# Patient Record
Sex: FEMALE | Race: BLACK OR AFRICAN AMERICAN | URBAN - METROPOLITAN AREA
[De-identification: names, ages, dates, MRNs, and addresses within clinical notes are randomized per-mention and may not be internally consistent; named-entity substitution may affect disease eponyms.]

---

## 2021-03-04 ENCOUNTER — OFFICE VISIT (OUTPATIENT)
Dept: URBAN - METROPOLITAN AREA CLINIC 98 | Facility: CLINIC | Age: 31
End: 2021-03-04

## 2021-03-04 RX ORDER — INFLIXIMAB 100 MG/10ML
INFUSE 5 MG/KG OVER NO LESS THAN 2 HOUR(S) BY INTRAVENOUS ROUTE EVERY 6 WEEKS INJECTION, POWDER, LYOPHILIZED, FOR SOLUTION INTRAVENOUS
Qty: 1 | Refills: 0 | Status: ACTIVE | COMMUNITY
Start: 1900-01-01

## 2021-03-08 ENCOUNTER — OFFICE VISIT (OUTPATIENT)
Dept: URBAN - METROPOLITAN AREA CLINIC 98 | Facility: CLINIC | Age: 31
End: 2021-03-08

## 2021-03-08 RX ORDER — INFLIXIMAB 100 MG/10ML
INFUSE 5 MG/KG OVER NO LESS THAN 2 HOUR(S) BY INTRAVENOUS ROUTE EVERY 6 WEEKS INJECTION, POWDER, LYOPHILIZED, FOR SOLUTION INTRAVENOUS
Qty: 1 | Refills: 0 | Status: ACTIVE | COMMUNITY
Start: 1900-01-01

## 2021-03-22 ENCOUNTER — OFFICE VISIT (OUTPATIENT)
Dept: URBAN - METROPOLITAN AREA CLINIC 98 | Facility: CLINIC | Age: 31
End: 2021-03-22

## 2021-03-22 RX ORDER — INFLIXIMAB 100 MG/10ML
INFUSE 5 MG/KG OVER NO LESS THAN 2 HOUR(S) BY INTRAVENOUS ROUTE EVERY 6 WEEKS INJECTION, POWDER, LYOPHILIZED, FOR SOLUTION INTRAVENOUS
Qty: 1 | Refills: 0 | Status: ACTIVE | COMMUNITY
Start: 1900-01-01

## 2023-01-18 ENCOUNTER — OFFICE VISIT (OUTPATIENT)
Dept: URBAN - METROPOLITAN AREA CLINIC 105 | Facility: CLINIC | Age: 33
End: 2023-01-18
Payer: COMMERCIAL

## 2023-01-18 VITALS
HEART RATE: 81 BPM | DIASTOLIC BLOOD PRESSURE: 76 MMHG | WEIGHT: 126.6 LBS | HEIGHT: 68 IN | SYSTOLIC BLOOD PRESSURE: 114 MMHG | TEMPERATURE: 97.4 F | BODY MASS INDEX: 19.19 KG/M2

## 2023-01-18 DIAGNOSIS — K52.9 IBD (INFLAMMATORY BOWEL DISEASE): ICD-10-CM

## 2023-01-18 DIAGNOSIS — N82.3 RECTOVAGINAL FISTULA: ICD-10-CM

## 2023-01-18 DIAGNOSIS — R63.0 LOSS OF APPETITE: ICD-10-CM

## 2023-01-18 DIAGNOSIS — R10.84 GENERALIZED ABDOMINAL PAIN: ICD-10-CM

## 2023-01-18 DIAGNOSIS — K50.113 CROHN'S DISEASE OF LARGE INTESTINE WITH FISTULA: ICD-10-CM

## 2023-01-18 PROBLEM — 7620006: Status: ACTIVE | Noted: 2023-01-18

## 2023-01-18 PROBLEM — 65619001: Status: ACTIVE | Noted: 2023-01-18

## 2023-01-18 PROBLEM — 79890006: Status: ACTIVE | Noted: 2023-01-18

## 2023-01-18 PROCEDURE — 99204 OFFICE O/P NEW MOD 45 MIN: CPT | Performed by: INTERNAL MEDICINE

## 2023-01-18 RX ORDER — MOXIFLOXACIN HYDROCHLORIDE 400 MG/1
1 TABLET TABLET, FILM COATED ORAL ONCE A DAY
Qty: 10 | OUTPATIENT
Start: 2023-01-18 | End: 2023-01-28

## 2023-01-18 RX ORDER — BISACODYL 5 MG
TAKE 4 TABLET, DELAYED RELEASE (ENTERIC COATED) ORAL
Qty: 4 | OUTPATIENT
Start: 2023-01-18 | End: 2023-01-19

## 2023-01-18 RX ORDER — SODIUM, POTASSIUM,MAG SULFATES 17.5-3.13G
177 ML SOLUTION, RECONSTITUTED, ORAL ORAL
Qty: 1 KIT | Refills: 0 | OUTPATIENT
Start: 2023-01-18 | End: 2023-01-19

## 2023-01-18 RX ORDER — INFLIXIMAB 100 MG/10ML
INFUSE 5 MG/KG OVER NO LESS THAN 2 HOUR(S) BY INTRAVENOUS ROUTE EVERY 6 WEEKS INJECTION, POWDER, LYOPHILIZED, FOR SOLUTION INTRAVENOUS
Qty: 1 | Refills: 0 | Status: DISCONTINUED | COMMUNITY
Start: 1900-01-01

## 2023-01-18 NOTE — HPI-TODAY'S VISIT:
Pt says that at the age of 16, she was diagnosed with RA. she was treated for that. was at [facility] in [place]. she had a rectovaginal fistula and diagnosed with Remicade. Moved to [place] in 2014. was followed by [facility] and was on Remicade at the time. had insurance issues and had to come off that.  - Has had no treatment at all in two years. she reports having abdominal pain and "digestive issues'. having acid reflux. no blood in the stool. - her stools are relatively normal. she has some constipation and some straining.

## 2023-01-19 ENCOUNTER — TELEPHONE ENCOUNTER (OUTPATIENT)
Dept: URBAN - METROPOLITAN AREA CLINIC 105 | Facility: CLINIC | Age: 33
End: 2023-01-19

## 2023-01-19 RX ORDER — MOXIFLOXACIN HYDROCHLORIDE 400 MG/1
1 TABLET TABLET, FILM COATED ORAL ONCE A DAY
Qty: 10
Start: 2023-01-18 | End: 2023-01-29

## 2023-01-22 LAB
A/G RATIO: 1.4
ALBUMIN: 4.5
ALKALINE PHOSPHATASE: 63
ALT (SGPT): 17
ANCA SCREEN: NEGATIVE
AST (SGOT): 22
BILIRUBIN, TOTAL: 0.3
BUN/CREATININE RATIO: (no result)
BUN: 11
C-REACTIVE PROTEIN, QUANT: 6.3
CALCIUM: 9.8
CARBON DIOXIDE, TOTAL: 27
CHLORIDE: 105
CREATININE: 0.63
EGFR: 122
FOLATE (FOLIC ACID), SERUM: 11.8
GLOBULIN, TOTAL: 3.2
GLUCOSE: 69
HEMATOCRIT: 37.1
HEMOGLOBIN: 12
MCH: 27.9
MCHC: 32.3
MCV: 86.3
MPV: 9.9
MYELOPEROXIDASE ANTIBODY: <1
PLATELET COUNT: 377
POTASSIUM: 4
PROTEIN, TOTAL: 7.7
PROTEINASE-3 ANTIBODY: <1
RDW: 15.7
RED BLOOD CELL COUNT: 4.3
SACCHAROMYCES CEREVISIAE AB (ASCA) (IGA): 11.1
SACCHAROMYCES CEREVISIAE AB (ASCA) (IGG): 11.9
SODIUM: 139
VITAMIN B12: 336
VITAMIN D,25-OH,TOTAL,IA: 14
WHITE BLOOD CELL COUNT: 7.3

## 2023-01-23 ENCOUNTER — TELEPHONE ENCOUNTER (OUTPATIENT)
Dept: URBAN - METROPOLITAN AREA CLINIC 105 | Facility: CLINIC | Age: 33
End: 2023-01-23

## 2023-01-23 RX ORDER — ERGOCALCIFEROL 1.25 MG/1
2 TABLETS CAPSULE ORAL
Qty: 20 | Refills: 1 | OUTPATIENT
Start: 2023-01-23 | End: 2023-07-22

## 2023-02-17 ENCOUNTER — CLAIMS CREATED FROM THE CLAIM WINDOW (OUTPATIENT)
Dept: URBAN - METROPOLITAN AREA SURGERY CENTER 16 | Facility: SURGERY CENTER | Age: 33
End: 2023-02-17
Payer: COMMERCIAL

## 2023-02-17 ENCOUNTER — CLAIMS CREATED FROM THE CLAIM WINDOW (OUTPATIENT)
Dept: URBAN - METROPOLITAN AREA SURGERY CENTER 16 | Facility: SURGERY CENTER | Age: 33
End: 2023-02-17

## 2023-02-17 DIAGNOSIS — K29.60 ADENOPAPILLOMATOSIS GASTRICA: ICD-10-CM

## 2023-02-17 DIAGNOSIS — K50.80 CROHN'S COLITIS: ICD-10-CM

## 2023-02-17 PROCEDURE — G8907 PT DOC NO EVENTS ON DISCHARG: HCPCS | Performed by: INTERNAL MEDICINE

## 2023-02-17 PROCEDURE — 45380 COLONOSCOPY AND BIOPSY: CPT | Performed by: INTERNAL MEDICINE

## 2023-02-17 PROCEDURE — 43239 EGD BIOPSY SINGLE/MULTIPLE: CPT | Performed by: INTERNAL MEDICINE

## 2023-04-19 ENCOUNTER — WEB ENCOUNTER (OUTPATIENT)
Dept: URBAN - METROPOLITAN AREA CLINIC 105 | Facility: CLINIC | Age: 33
End: 2023-04-19

## 2023-04-19 RX ORDER — PREDNISONE 10 MG/1
4 PO DAILY X 10 DAYS, THEN 3 PO DAILY X 10 DAYS, THEN 2 PO DAILY X 10 DAYS, THEN 1 PO DAILY X 10 DAYS TABLET ORAL ONCE A DAY
Qty: 100 | Refills: 0 | OUTPATIENT
Start: 2023-04-21 | End: 2023-05-31

## 2023-04-26 ENCOUNTER — TELEPHONE ENCOUNTER (OUTPATIENT)
Dept: URBAN - METROPOLITAN AREA CLINIC 105 | Facility: CLINIC | Age: 33
End: 2023-04-26

## 2023-04-26 ENCOUNTER — WEB ENCOUNTER (OUTPATIENT)
Dept: URBAN - METROPOLITAN AREA CLINIC 105 | Facility: CLINIC | Age: 33
End: 2023-04-26

## 2023-04-26 ENCOUNTER — OFFICE VISIT (OUTPATIENT)
Dept: URBAN - METROPOLITAN AREA CLINIC 105 | Facility: CLINIC | Age: 33
End: 2023-04-26
Payer: COMMERCIAL

## 2023-04-26 DIAGNOSIS — E55.9 VITAMIN D DEFICIENCY: ICD-10-CM

## 2023-04-26 DIAGNOSIS — K50.113 CROHN'S DISEASE OF LARGE INTESTINE WITH FISTULA: ICD-10-CM

## 2023-04-26 PROCEDURE — 99214 OFFICE O/P EST MOD 30 MIN: CPT | Performed by: INTERNAL MEDICINE

## 2023-04-26 RX ORDER — PREDNISONE 10 MG/1
4 PO DAILY X 10 DAYS, THEN 3 PO DAILY X 10 DAYS, THEN 2 PO DAILY X 10 DAYS, THEN 1 PO DAILY X 10 DAYS TABLET ORAL ONCE A DAY
Qty: 100 | Refills: 0 | Status: ACTIVE | COMMUNITY
Start: 2023-04-21 | End: 2023-05-31

## 2023-04-26 RX ORDER — ERGOCALCIFEROL 1.25 MG/1
2 TABLETS CAPSULE ORAL
Qty: 20 | Refills: 1 | Status: ACTIVE | COMMUNITY
Start: 2023-01-23 | End: 2023-07-22

## 2023-04-26 NOTE — PHYSICAL EXAM EYES:
Conjuntivae and eyelids appear normal, Sclerae : White without injection What Type Of Note Output Would You Prefer (Optional)?: Standard Output Hpi Title: Evaluation of Skin Lesions How Severe Are Your Spot(S)?: mild Have Your Spot(S) Been Treated In The Past?: has not been treated Additional History: Pt is here today for her annual full body skin exam due to high risk of SCC, [place].

## 2023-04-26 NOTE — HPI-TODAY'S VISIT:
The patient presents on follow-up for Crohn's. She normally follows with Dr. Gaspar, but is seeing me for an urgent appointment.  She notes she began having diarrhea and messaged Dr. Gaspar last week.  Prednisone was prescribed, but the patient did not pick it up (pharmacy did not alert her it was prescribed).  Today, she says she has 3 BMs daily. BMs can be mainly mucus w/ blood or watery or softer/looser. She sees streaks of blood with her BMs. Her abdominal pain localizes to her lower abdomen. She says she was off Crohn's treatment for closer to 3 years.   She has tolerated and responded well to prednisone when taken previously. She has not started on vit D supplementation.  Labs 1/18/23 - ANCA negative, vit D 14. CBC, CMP, vit B12, folate, CRP all normal.

## 2023-05-05 LAB
A/G RATIO: 1.1
ALBUMIN: 4.4
ALKALINE PHOSPHATASE: 79
ALT (SGPT): 14
AST (SGOT): 20
BILIRUBIN, TOTAL: 0.3
BUN/CREATININE RATIO: 16
BUN: 12
C-REACTIVE PROTEIN, QUANT: 7
CALCIUM: 9.7
CARBON DIOXIDE, TOTAL: 23
CHLORIDE: 102
CREATININE: 0.77
EGFR: 105
FOLATE (FOLIC ACID), SERUM: 8.4
GLOBULIN, TOTAL: 3.9
GLUCOSE: 72
HBSAG SCREEN: NEGATIVE
HEMATOCRIT: 40.2
HEMOGLOBIN: 12.7
HEP B SURFACE AB, QUAL: REACTIVE
HEP C VIRUS AB: NON REACTIVE
HIV AB/P24 AG SCREEN: NON REACTIVE
INTERPRETATION:: (no result)
Lab: (no result)
MCH: 28.1
MCHC: 31.6
MCV: 89
NRBC: (no result)
PLATELETS: 407
POTASSIUM: 4.1
PROTEIN, TOTAL: 8.3
QUANTIFERON CRITERIA: (no result)
QUANTIFERON INCUBATION: (no result)
QUANTIFERON MITOGEN VALUE: >10
QUANTIFERON NIL VALUE: 0.13
QUANTIFERON TB1 AG VALUE: 0.13
QUANTIFERON TB2 AG VALUE: 0.14
QUANTIFERON-TB GOLD PLUS: NEGATIVE
RBC: 4.52
RDW: 16.5
SODIUM: 141
TPMT ACTIVITY: 26.1
VITAMIN B12: 499
WBC: 7.5

## 2023-05-08 ENCOUNTER — LAB OUTSIDE AN ENCOUNTER (OUTPATIENT)
Dept: URBAN - METROPOLITAN AREA CLINIC 105 | Facility: CLINIC | Age: 33
End: 2023-05-08

## 2023-05-09 LAB
HEP B CORE AB, TOT: NEGATIVE
Lab: (no result)

## 2023-05-30 ENCOUNTER — OFFICE VISIT (OUTPATIENT)
Dept: URBAN - METROPOLITAN AREA CLINIC 105 | Facility: CLINIC | Age: 33
End: 2023-05-30
Payer: COMMERCIAL

## 2023-05-30 ENCOUNTER — LAB OUTSIDE AN ENCOUNTER (OUTPATIENT)
Dept: URBAN - METROPOLITAN AREA CLINIC 105 | Facility: CLINIC | Age: 33
End: 2023-05-30

## 2023-05-30 VITALS
WEIGHT: 141.2 LBS | BODY MASS INDEX: 21.4 KG/M2 | DIASTOLIC BLOOD PRESSURE: 81 MMHG | TEMPERATURE: 98.2 F | HEIGHT: 68 IN | SYSTOLIC BLOOD PRESSURE: 120 MMHG | HEART RATE: 90 BPM

## 2023-05-30 DIAGNOSIS — K50.10 CROHN'S DISEASE OF LARGE INTESTINE WITHOUT COMPLICATION: ICD-10-CM

## 2023-05-30 DIAGNOSIS — K92.1 HEMATOCHEZIA: ICD-10-CM

## 2023-05-30 DIAGNOSIS — K50.80 CROHN'S COLITIS: ICD-10-CM

## 2023-05-30 DIAGNOSIS — K50.811 CROHN'S DISEASE OF BOTH SMALL AND LARGE INTESTINE WITH RECTAL BLEEDING: ICD-10-CM

## 2023-05-30 DIAGNOSIS — R19.7 ACUTE DIARRHEA: ICD-10-CM

## 2023-05-30 DIAGNOSIS — N82.3 RECTOVAGINAL FISTULA: ICD-10-CM

## 2023-05-30 PROBLEM — 7620006: Status: ACTIVE | Noted: 2023-05-30

## 2023-05-30 PROCEDURE — 99214 OFFICE O/P EST MOD 30 MIN: CPT | Performed by: INTERNAL MEDICINE

## 2023-05-30 RX ORDER — ERGOCALCIFEROL 1.25 MG/1
2 TABLETS CAPSULE ORAL
Qty: 20 | Refills: 1 | Status: ACTIVE | COMMUNITY
Start: 2023-01-23 | End: 2023-07-22

## 2023-05-30 RX ORDER — PREDNISONE 10 MG/1
4 PO DAILY X 10 DAYS, THEN 3 PO DAILY X 10 DAYS, THEN 2 PO DAILY X 10 DAYS, THEN 1 PO DAILY X 10 DAYS TABLET ORAL ONCE A DAY
Qty: 100 | Refills: 0 | Status: ACTIVE | COMMUNITY
Start: 2023-04-21 | End: 2023-05-31

## 2023-05-30 RX ORDER — VEDOLIZUMAB 300 MG/5ML
AS DIRECTED INJECTION, POWDER, LYOPHILIZED, FOR SOLUTION INTRAVENOUS
Qty: 1 | Refills: 6 | OUTPATIENT
Start: 2023-05-30

## 2023-05-30 NOTE — HPI-TODAY'S VISIT:
The patient presents on follow-up for Crohn's. She normally follows with Dr. Gaspar, but is seeing me for an urgent appointment.  She notes she began having diarrhea and messaged Dr. Gaspar last week.  Prednisone was prescribed, but the patient did not pick it up (pharmacy did not alert her it was prescribed).  Today, she says she has 3 BMs daily. BMs can be mainly mucus w/ blood or watery or softer/looser. She sees streaks of blood with her BMs. Her abdominal pain localizes to her lower abdomen. She says she was off Crohn's treatment for closer to 3 years.   She has tolerated and responded well to prednisone when taken previously. She has not started on vit D supplementation.  Labs 1/18/23 - ANCA negative, vit D 14. CBC, CMP, vit B12, folate, CRP all normal. -  05/30/2023:  In February 2023 I performed this patient's bidirectional endoscopy.  She had a small hiatal hernia.  She had perianal skin tags.  There were some old pseudo polyps in the sigmoid and transverse colons.  Biopsies from this area did confirm chronic inflammation with some granulomas present.  This is all consistent with the course and diagnosis of Crohn's disease made years ago. - she developed abdominal pain and blood in the stool. saw dr. ballard and had a prednisone course -  she has not had any treatment whatsoever for her inflammatory bowel disease in about 2-3 years.

## 2023-06-12 ENCOUNTER — TELEPHONE ENCOUNTER (OUTPATIENT)
Dept: URBAN - METROPOLITAN AREA CLINIC 97 | Facility: CLINIC | Age: 33
End: 2023-06-12

## 2023-06-13 ENCOUNTER — CLAIMS CREATED FROM THE CLAIM WINDOW (OUTPATIENT)
Dept: URBAN - METROPOLITAN AREA CLINIC 105 | Facility: CLINIC | Age: 33
End: 2023-06-13
Payer: COMMERCIAL

## 2023-06-13 VITALS
WEIGHT: 133 LBS | BODY MASS INDEX: 20.16 KG/M2 | DIASTOLIC BLOOD PRESSURE: 63 MMHG | SYSTOLIC BLOOD PRESSURE: 116 MMHG | HEART RATE: 102 BPM | HEIGHT: 68 IN

## 2023-06-13 DIAGNOSIS — K50.10 CROHN'S DISEASE OF COLON: ICD-10-CM

## 2023-06-13 DIAGNOSIS — K50.10 CROHN'S DISEASE OF COLON WITHOUT COMPLICATION: ICD-10-CM

## 2023-06-13 PROBLEM — 50440006: Status: ACTIVE | Noted: 2023-06-13

## 2023-06-13 PROCEDURE — 99214 OFFICE O/P EST MOD 30 MIN: CPT | Performed by: INTERNAL MEDICINE

## 2023-06-13 RX ORDER — ERGOCALCIFEROL 1.25 MG/1
2 TABLETS CAPSULE ORAL
Qty: 20 | Refills: 1 | Status: ACTIVE | COMMUNITY
Start: 2023-01-23 | End: 2023-07-22

## 2023-06-13 RX ORDER — PREDNISONE 10 MG/1
2 TABLET TABLET ORAL ONCE A DAY
Qty: 60 TABLET | Refills: 5 | OUTPATIENT
Start: 2023-06-13 | End: 2023-12-09

## 2023-06-13 RX ORDER — VEDOLIZUMAB 300 MG/5ML
AS DIRECTED INJECTION, POWDER, LYOPHILIZED, FOR SOLUTION INTRAVENOUS
Qty: 1 | Refills: 6 | Status: ON HOLD | COMMUNITY
Start: 2023-05-30

## 2023-06-13 NOTE — HPI-TODAY'S VISIT:
The patient presents on follow-up for Crohn's. She normally follows with Dr. Gaspar, but is seeing me for an urgent appointment.  She notes she began having diarrhea and messaged Dr. Gaspar last week.  Prednisone was prescribed, but the patient did not pick it up (pharmacy did not alert her it was prescribed).  Today, she says she has 3 BMs daily. BMs can be mainly mucus w/ blood or watery or softer/looser. She sees streaks of blood with her BMs. Her abdominal pain localizes to her lower abdomen. She says she was off Crohn's treatment for closer to 3 years.   She has tolerated and responded well to prednisone when taken previously. She has not started on vit D supplementation.  Labs 1/18/23 - ANCA negative, vit D 14. CBC, CMP, vit B12, folate, CRP all normal. -  05/30/2023:  In February 2023 I performed this patient's bidirectional endoscopy.  She had a small hiatal hernia.  She had perianal skin tags.  There were some old pseudo polyps in the sigmoid and transverse colons.  Biopsies from this area did confirm chronic inflammation with some granulomas present.  This is all consistent with the course and diagnosis of Crohn's disease made years ago. - she developed abdominal pain and blood in the stool. saw dr. ballard and had a prednisone course -  she has not had any treatment whatsoever for her inflammatory bowel disease in about 2-3 years. -  06/13/2023:  We are still in the process of getting her Entyvio approved.  She finished the prednisone taper and then symptoms came back.  Having loose diarrhea abdominal cramping.

## 2023-06-20 ENCOUNTER — TELEPHONE ENCOUNTER (OUTPATIENT)
Dept: URBAN - METROPOLITAN AREA CLINIC 97 | Facility: CLINIC | Age: 33
End: 2023-06-20

## 2023-06-26 ENCOUNTER — TELEPHONE ENCOUNTER (OUTPATIENT)
Dept: URBAN - METROPOLITAN AREA CLINIC 3 | Facility: CLINIC | Age: 33
End: 2023-06-26

## 2023-07-17 ENCOUNTER — TELEPHONE ENCOUNTER (OUTPATIENT)
Dept: URBAN - METROPOLITAN AREA CLINIC 97 | Facility: CLINIC | Age: 33
End: 2023-07-17

## 2023-08-02 ENCOUNTER — CLAIMS CREATED FROM THE CLAIM WINDOW (OUTPATIENT)
Dept: URBAN - METROPOLITAN AREA CLINIC 105 | Facility: CLINIC | Age: 33
End: 2023-08-02
Payer: COMMERCIAL

## 2023-08-02 VITALS
DIASTOLIC BLOOD PRESSURE: 75 MMHG | BODY MASS INDEX: 21.67 KG/M2 | HEIGHT: 68 IN | WEIGHT: 143 LBS | HEART RATE: 99 BPM | TEMPERATURE: 97.3 F | SYSTOLIC BLOOD PRESSURE: 117 MMHG

## 2023-08-02 DIAGNOSIS — K50.10 CROHN'S DISEASE: ICD-10-CM

## 2023-08-02 DIAGNOSIS — K50.111 CROHN'S COLITIS, WITH RECTAL BLEEDING: ICD-10-CM

## 2023-08-02 PROBLEM — 7620006: Status: ACTIVE | Noted: 2023-08-02

## 2023-08-02 PROCEDURE — 99214 OFFICE O/P EST MOD 30 MIN: CPT | Performed by: INTERNAL MEDICINE

## 2023-08-02 RX ORDER — VEDOLIZUMAB 300 MG/5ML
AS DIRECTED INJECTION, POWDER, LYOPHILIZED, FOR SOLUTION INTRAVENOUS
Qty: 1 | Refills: 6 | Status: ACTIVE | COMMUNITY
Start: 2023-05-30

## 2023-08-02 RX ORDER — PREDNISONE 10 MG/1
2 TABLET TABLET ORAL ONCE A DAY
Qty: 60 TABLET | Refills: 5 | Status: ACTIVE | COMMUNITY
Start: 2023-06-13 | End: 2023-12-09

## 2023-08-02 NOTE — HPI-TODAY'S VISIT:
The patient presents on follow-up for Crohn's. She normally follows with Dr. Gaspar, but is seeing me for an urgent appointment.  She notes she began having diarrhea and messaged Dr. Gaspar last week.  Prednisone was prescribed, but the patient did not pick it up (pharmacy did not alert her it was prescribed).  Today, she says she has 3 BMs daily. BMs can be mainly mucus w/ blood or watery or softer/looser. She sees streaks of blood with her BMs. Her abdominal pain localizes to her lower abdomen. She says she was off Crohn's treatment for closer to 3 years.   She has tolerated and responded well to prednisone when taken previously. She has not started on vit D supplementation.  Labs 1/18/23 - ANCA negative, vit D 14. CBC, CMP, vit B12, folate, CRP all normal. -  05/30/2023:  In February 2023 I performed this patient's bidirectional endoscopy.  She had a small hiatal hernia.  She had perianal skin tags.  There were some old pseudo polyps in the sigmoid and transverse colons.  Biopsies from this area did confirm chronic inflammation with some granulomas present.  This is all consistent with the course and diagnosis of Crohn's disease made years ago. - she developed abdominal pain and blood in the stool. saw dr. ballard and had a prednisone course -  she has not had any treatment whatsoever for her inflammatory bowel disease in about 2-3 years. -  06/13/2023:  We are still in the process of getting her Entyvio approved.  She finished the prednisone taper and then symptoms came back.  Having loose diarrhea abdominal cramping. - 8/2/2023: finally got her first infusion of Entyvio. she continues on 20mg of prednisone. not having abdominal pain or blood in the stool having normal stools.

## 2023-08-03 ENCOUNTER — TELEPHONE ENCOUNTER (OUTPATIENT)
Dept: URBAN - METROPOLITAN AREA CLINIC 97 | Facility: CLINIC | Age: 33
End: 2023-08-03

## 2023-09-20 ENCOUNTER — CLAIMS CREATED FROM THE CLAIM WINDOW (OUTPATIENT)
Dept: URBAN - METROPOLITAN AREA CLINIC 105 | Facility: CLINIC | Age: 33
End: 2023-09-20
Payer: COMMERCIAL

## 2023-09-20 ENCOUNTER — WEB ENCOUNTER (OUTPATIENT)
Dept: URBAN - METROPOLITAN AREA CLINIC 105 | Facility: CLINIC | Age: 33
End: 2023-09-20

## 2023-09-20 ENCOUNTER — CLAIMS CREATED FROM THE CLAIM WINDOW (OUTPATIENT)
Dept: URBAN - METROPOLITAN AREA CLINIC 105 | Facility: CLINIC | Age: 33
End: 2023-09-20

## 2023-09-20 VITALS
SYSTOLIC BLOOD PRESSURE: 122 MMHG | WEIGHT: 149 LBS | TEMPERATURE: 98.1 F | HEART RATE: 86 BPM | DIASTOLIC BLOOD PRESSURE: 81 MMHG | HEIGHT: 68 IN | BODY MASS INDEX: 22.58 KG/M2

## 2023-09-20 DIAGNOSIS — K51.40 INFLAMMATORY FIBROID POLYP OF LARGE INTESTINE: ICD-10-CM

## 2023-09-20 DIAGNOSIS — K50.111 CROHN'S COLITIS, WITH RECTAL BLEEDING: ICD-10-CM

## 2023-09-20 DIAGNOSIS — K52.89 OTHER SPECIFIED NONINFECTIVE GASTROENTERITIS AND COLITIS: ICD-10-CM

## 2023-09-20 DIAGNOSIS — K52.9 IBD (INFLAMMATORY BOWEL DISEASE): ICD-10-CM

## 2023-09-20 PROBLEM — 50440006: Status: ACTIVE | Noted: 2023-09-20

## 2023-09-20 PROBLEM — 13025001: Status: ACTIVE | Noted: 2023-09-20

## 2023-09-20 PROCEDURE — 99214 OFFICE O/P EST MOD 30 MIN: CPT | Performed by: INTERNAL MEDICINE

## 2023-09-20 RX ORDER — VEDOLIZUMAB 300 MG/5ML
AS DIRECTED INJECTION, POWDER, LYOPHILIZED, FOR SOLUTION INTRAVENOUS
Qty: 1 | Refills: 6 | Status: ACTIVE | COMMUNITY
Start: 2023-05-30

## 2023-09-20 RX ORDER — PREDNISONE 10 MG/1
2 TABLET TABLET ORAL ONCE A DAY
Qty: 60 TABLET | Refills: 5 | Status: ACTIVE | COMMUNITY
Start: 2023-06-13 | End: 2023-12-09

## 2023-09-20 NOTE — HPI-TODAY'S VISIT:
The patient presents on follow-up for Crohn's. She normally follows with Dr. Gaspar, but is seeing me for an urgent appointment.  She notes she began having diarrhea and messaged Dr. Gaspar last week.  Prednisone was prescribed, but the patient did not pick it up (pharmacy did not alert her it was prescribed).  Today, she says she has 3 BMs daily. BMs can be mainly mucus w/ blood or watery or softer/looser. She sees streaks of blood with her BMs. Her abdominal pain localizes to her lower abdomen. She says she was off Crohn's treatment for closer to 3 years.   She has tolerated and responded well to prednisone when taken previously. She has not started on vit D supplementation.  Labs 1/18/23 - ANCA negative, vit D 14. CBC, CMP, vit B12, folate, CRP all normal. -  05/30/2023:  In February 2023 I performed this patient's bidirectional endoscopy.  She had a small hiatal hernia.  She had perianal skin tags.  There were some old pseudo polyps in the sigmoid and transverse colons.  Biopsies from this area did confirm chronic inflammation with some granulomas present.  This is all consistent with the course and diagnosis of Crohn's disease made years ago. - she developed abdominal pain and blood in the stool. saw dr. ballard and had a prednisone course -  she has not had any treatment whatsoever for her inflammatory bowel disease in about 2-3 years. -  06/13/2023:  We are still in the process of getting her Entyvio approved.  She finished the prednisone taper and then symptoms came back.  Having loose diarrhea abdominal cramping. - 8/2/2023: finally got her first infusion of Entyvio. she continues on 20mg of prednisone. not having abdominal pain or blood in the stool having normal stools.  - 9.20.2023: she got her first three loading doses of Etyvio. she is feeling just fine. no blood in the stool. has been of prednisone for about 2 weeks.

## 2023-09-21 LAB
A/G RATIO: 1.2
ALBUMIN: 4
ALKALINE PHOSPHATASE: 51
ALT (SGPT): 11
AST (SGOT): 14
BILIRUBIN, TOTAL: 0.3
BUN/CREATININE RATIO: (no result)
BUN: 8
C-REACTIVE PROTEIN, QUANT: 3
CALCIUM: 9.4
CARBON DIOXIDE, TOTAL: 26
CHLORIDE: 106
CREATININE: 0.72
EGFR: 114
FOLATE (FOLIC ACID), SERUM: 13.2
GLOBULIN, TOTAL: 3.3
GLUCOSE: 84
HEMATOCRIT: 35.3
HEMOGLOBIN: 11.5
MCH: 28.1
MCHC: 32.6
MCV: 86.3
MPV: 9.6
PLATELET COUNT: 370
POTASSIUM: 3.9
PROTEIN, TOTAL: 7.3
RDW: 16.7
RED BLOOD CELL COUNT: 4.09
SODIUM: 139
VITAMIN B12: 373
VITAMIN D,25-OH,TOTAL,IA: 31
WHITE BLOOD CELL COUNT: 5.7

## 2023-09-22 ENCOUNTER — TELEPHONE ENCOUNTER (OUTPATIENT)
Dept: URBAN - METROPOLITAN AREA CLINIC 105 | Facility: CLINIC | Age: 33
End: 2023-09-22

## 2024-03-07 ENCOUNTER — OV EP (OUTPATIENT)
Dept: URBAN - METROPOLITAN AREA CLINIC 105 | Facility: CLINIC | Age: 34
End: 2024-03-07

## 2024-07-29 ENCOUNTER — DASHBOARD ENCOUNTERS (OUTPATIENT)
Age: 34
End: 2024-07-29

## 2024-07-30 ENCOUNTER — OFFICE VISIT (OUTPATIENT)
Dept: URBAN - METROPOLITAN AREA CLINIC 105 | Facility: CLINIC | Age: 34
End: 2024-07-30

## 2024-08-13 ENCOUNTER — OFFICE VISIT (OUTPATIENT)
Dept: URBAN - METROPOLITAN AREA CLINIC 105 | Facility: CLINIC | Age: 34
End: 2024-08-13
Payer: COMMERCIAL

## 2024-08-13 VITALS
DIASTOLIC BLOOD PRESSURE: 81 MMHG | BODY MASS INDEX: 20 KG/M2 | SYSTOLIC BLOOD PRESSURE: 115 MMHG | WEIGHT: 132 LBS | TEMPERATURE: 98.4 F | HEART RATE: 87 BPM | HEIGHT: 68 IN

## 2024-08-13 DIAGNOSIS — K50.10 CROHN'S DISEASE OF COLON WITHOUT COMPLICATION: ICD-10-CM

## 2024-08-13 DIAGNOSIS — K59.09 CHRONIC CONSTIPATION: ICD-10-CM

## 2024-08-13 DIAGNOSIS — K58.8 OTHER IRRITABLE BOWEL SYNDROME: ICD-10-CM

## 2024-08-13 PROCEDURE — 99214 OFFICE O/P EST MOD 30 MIN: CPT | Performed by: INTERNAL MEDICINE

## 2024-08-13 RX ORDER — VEDOLIZUMAB 300 MG/5ML
AS DIRECTED INJECTION, POWDER, LYOPHILIZED, FOR SOLUTION INTRAVENOUS
Qty: 1 | Refills: 6 | Status: ACTIVE | COMMUNITY
Start: 2023-05-30

## 2024-09-19 ENCOUNTER — TELEPHONE ENCOUNTER (OUTPATIENT)
Dept: URBAN - METROPOLITAN AREA CLINIC 105 | Facility: CLINIC | Age: 34
End: 2024-09-19

## 2024-10-16 ENCOUNTER — OFFICE VISIT (OUTPATIENT)
Dept: URBAN - METROPOLITAN AREA SURGERY CENTER 15 | Facility: SURGERY CENTER | Age: 34
End: 2024-10-16

## 2025-02-13 ENCOUNTER — OFFICE VISIT (OUTPATIENT)
Dept: URBAN - METROPOLITAN AREA CLINIC 105 | Facility: CLINIC | Age: 35
End: 2025-02-13